# Patient Record
(demographics unavailable — no encounter records)

---

## 2025-06-13 NOTE — ASSESSMENT
[FreeTextEntry1] : -Patient seen and examined. All finding discussed -No indication for toenail procedure today  -Advised to return to clinic for PNA if ingrown nail occurs again -RTC PRN

## 2025-06-13 NOTE — PHYSICAL EXAM
[General Appearance - Alert] : alert [General Appearance - In No Acute Distress] : in no acute distress [Delayed in the Right Toes] : capillary refills normal in right toes [Delayed in the Left Toes] : capillary refills normal in the left toes [2+] : left foot dorsalis pedis 2+ [No Joint Swelling] : no joint swelling [] : normal strength/tone [Normal Foot/Ankle] : Both lower extremities were exposed and visualized. Standing exam demonstrates normal foot posture and alignment. Hindfoot exam shows no hindfoot valgus or varus [Skin Color & Pigmentation] : normal skin color and pigmentation [Skin Lesions] : no skin lesions [Foot Ulcer] : no foot ulcer [Sensation] : the sensory exam was normal to light touch and pinprick

## 2025-06-13 NOTE — HISTORY OF PRESENT ILLNESS
[Sneakers] : lamin [FreeTextEntry1] : Presents for chronic ingrowing toenail to bilateral hallux  Went to ED last month when mom noticed drainage from the area Soaked feet at home with improvement Today, toenails are not ingrown, no pain upon palpation, no signs of infection